# Patient Record
Sex: FEMALE | Race: WHITE
[De-identification: names, ages, dates, MRNs, and addresses within clinical notes are randomized per-mention and may not be internally consistent; named-entity substitution may affect disease eponyms.]

---

## 2019-03-14 ENCOUNTER — HOSPITAL ENCOUNTER (OUTPATIENT)
Dept: HOSPITAL 74 - FASU | Age: 44
Discharge: HOME | End: 2019-03-14
Attending: PLASTIC SURGERY
Payer: COMMERCIAL

## 2019-03-14 VITALS — SYSTOLIC BLOOD PRESSURE: 108 MMHG | HEART RATE: 86 BPM | DIASTOLIC BLOOD PRESSURE: 72 MMHG

## 2019-03-14 VITALS — TEMPERATURE: 98.2 F

## 2019-03-14 VITALS — BODY MASS INDEX: 26.5 KG/M2

## 2019-03-14 DIAGNOSIS — E88.1: Primary | ICD-10-CM

## 2019-03-14 PROCEDURE — 0J073ZZ ALTERATION OF BACK SUBCUTANEOUS TISSUE AND FASCIA, PERCUTANEOUS APPROACH: ICD-10-PCS | Performed by: PLASTIC SURGERY

## 2019-03-14 PROCEDURE — 0J083ZZ ALTERATION OF ABDOMEN SUBCUTANEOUS TISSUE AND FASCIA, PERCUTANEOUS APPROACH: ICD-10-PCS | Performed by: PLASTIC SURGERY

## 2019-03-14 PROCEDURE — 0J0M3ZZ ALTERATION OF LEFT UPPER LEG SUBCUTANEOUS TISSUE AND FASCIA, PERCUTANEOUS APPROACH: ICD-10-PCS | Performed by: PLASTIC SURGERY

## 2019-03-14 PROCEDURE — 0J0L3ZZ ALTERATION OF RIGHT UPPER LEG SUBCUTANEOUS TISSUE AND FASCIA, PERCUTANEOUS APPROACH: ICD-10-PCS | Performed by: PLASTIC SURGERY

## 2019-03-14 NOTE — OP
Operative Note





- Note:


Operative Date: 03/14/19


Pre-Operative Diagnosis: lipodystrophy


Operation: medial thigh, back and abdomen liposuction


Post-Operative Diagnosis: Same as Pre-op


Surgeon: Goldberg,Neal D


Anesthesia: General


Operative Report Dictated: Yes

## 2019-03-14 NOTE — OP
DATE OF OPERATION:  03/14/2019

 

TITLE OF PROCEDURE:  Liposuction to abdomen, flanks, back and medial thighs.

 

PREOPERATIVE DIAGNOSIS:  Lipodystrophy.

 

POSTOPERATIVE DIAGNOSIS:  Lipodystrophy.

 

ATTENDING SURGEON:  Neal Goldberg, MD

 

ASSISTANT:  No assistants.

 

ANESTHESIA:  General endotracheal anesthesia.

 

Patient is seen in the holding area, marked in a standing upright position, awake and

aware of all areas for liposuction.  She is counseled on risks, benefits and

alternatives to the procedure as well as its limitations.  Understands and agrees to

proceed.  Patient is given 5000 units of subcutaneous heparin preoperatively.  She is

given BLADE hose and sequential compression stockings bilaterally.  She is brought to

the OR.  After being intubated and anesthetized she is then placed in a prone

position.  Appropriate positioning aids are used including chest rolls, pillow for

the knees and shins, heel protectors.  The patient is positioned and is carefully

checked by surgical and anesthesia teams.  She is prepped and draped in standard

surgical fashion.  At this point a timeout is called.  Patient, procedure, side and

sites are verified.  The liposuction is then performed initially with stab wound

incisions in multiple locations through which wetting solution is infiltrated.  For

the entire case a total of 4 L of wetting solution are used.  Three of the liters

consist of a liter of lactated ringers with 1 ampule of 1:1000 epinephrine and 20 mL

of 1% lidocaine plain.  The 4th bag does not include lidocaine.  The wetting solution

is infiltrated on the posterior surface to the medial thighs, flanks and back.  A

full 25 minutes is awaited for hemostatic effect of the wetting solution after which

the SAFE technique of liposuction is then performed.  This is done with pre and post

tunneling with a 4-mm basket-tipped cannula not connected to suction.  After this

separation phase liposuction is then performed with a combination of 3- and 4-mm

cannulas.  The lipoaspirates are 675 mL from each medial thigh.  The back in total

yields 600 mL and the flanks 100 mL each.  The endpoint is a smooth even contour.  It

should be noted that the total amount of lipoaspirate from the medial thighs was not

completed in the posterior prone position because some was accessed from the anterior

supine position.  The liposuction holes are closed after the post tunneling phase. 

This is done with a series of interrupted 5-0 nylon sutures and dressings are applied

with Steri-Strips, 2 x 2 gauze and Tegaderm.

 

Patient is then carefully transferred into a supine position on to her hospital bed

and then transferred back on to the operating table in a supine position.  Position

is once again checked by surgical and anesthesia teams.  She is reprepped and draped

in standard surgical fashion and  the procedure commences again in a similar fashion.

 Stab wound incisions are made through which our wetting solution is infiltrated. 

After awaiting the 25 minutes for hemostatic effect the liposuction is then performed

using the same SAFE technique.  The lipoaspirates were as previously described for

the medial thighs.  The abdomen entirely yields only 200 mL of lipoaspirate the bulk

of which is from the upper abdomen.  The lower abdomen is treated mostly with

smoothing using the pre and post tunneling with the basketed cannula as well as more

superficial tunneling with a 3-mm cannula until a smooth even contour is achieved. 

End point is a smooth even contour.  The patient is placed in a frog-leg position at

which point the medial thighs are reassessed.  Additional touch-up liposuction is

then performed in these areas.  The incisions are closed with a series of interrupted

5-0 nylon suture.  Dressings are applied with Steri-Strips and 2 x 2's and Tegaderm. 

Patient is awoken from anesthesia.  She is placed in a compressive garment,

transferred to recovery without complication.  Once again the total lipoaspirate for

this case is 2250 mL.

 

 

NEAL GOLDBERG, M.D. [id number]

DD: 03/14/2019 12:39

DT: 03/14/2019 13:11

Job #:  [id number]

## 2020-09-30 PROBLEM — Z00.00 ENCOUNTER FOR PREVENTIVE HEALTH EXAMINATION: Status: ACTIVE | Noted: 2020-09-30

## 2020-10-01 ENCOUNTER — APPOINTMENT (OUTPATIENT)
Dept: ORTHOPEDIC SURGERY | Facility: CLINIC | Age: 45
End: 2020-10-01
Payer: COMMERCIAL

## 2020-10-01 DIAGNOSIS — M77.11 LATERAL EPICONDYLITIS, RIGHT ELBOW: ICD-10-CM

## 2020-10-01 PROCEDURE — 73070 X-RAY EXAM OF ELBOW: CPT | Mod: RT

## 2020-10-01 PROCEDURE — 99204 OFFICE O/P NEW MOD 45 MIN: CPT

## 2020-10-01 RX ORDER — MULTIVITAMIN
TABLET ORAL
Refills: 0 | Status: ACTIVE | COMMUNITY

## 2020-11-04 ENCOUNTER — APPOINTMENT (OUTPATIENT)
Dept: ORTHOPEDIC SURGERY | Facility: CLINIC | Age: 45
End: 2020-11-04